# Patient Record
Sex: MALE | Race: OTHER
[De-identification: names, ages, dates, MRNs, and addresses within clinical notes are randomized per-mention and may not be internally consistent; named-entity substitution may affect disease eponyms.]

---

## 2020-02-05 ENCOUNTER — HOSPITAL ENCOUNTER (EMERGENCY)
Dept: HOSPITAL 74 - JER | Age: 50
LOS: 1 days | Discharge: HOME | End: 2020-02-06
Payer: COMMERCIAL

## 2020-02-05 VITALS — TEMPERATURE: 98.1 F

## 2020-02-05 VITALS — BODY MASS INDEX: 31.1 KG/M2
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
## 2020-02-05 DIAGNOSIS — S91.332A: Primary | ICD-10-CM

## 2020-02-05 DIAGNOSIS — E11.9: ICD-10-CM
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
## 2020-02-05 DIAGNOSIS — I10: ICD-10-CM

## 2020-02-05 NOTE — PDOC
History of Present Illness





- General


Chief Complaint: Injury


Stated Complaint: PUNCTURE NAILS


Time Seen by Provider: 02/05/20 21:22


History Source: Patient, Old Records


Exam Limitations: No Limitations





- History of Present Illness


Initial Comments: 





02/05/20 22:33





HISTORY OF PRESENT ILLNESS: 49-year-old male with past medical history of 

hypertension, appendectomy, DM 2 who presents emergency department for 

evaluation of puncture wound to his left foot.  Patient works as a day  

and was doing gildardo today when he got home he noticed bleeding from the sole 

of his left foot.  Upon inspection there was a nail in his shoe which is 

through the sole and it pierced the plantar surface of the foot.  Patient 

immediately remove the nail from his shoe and applied a dressing to his foot.  

Patient is unsure of his last tetanus shot per chart review reveals it was in 

2014.  He denies any pain in his foot but does endorse neuropathy.  Patient is 

noncompliant with his blood pressure or diabetes medications as he does not 

have insurance so he does not go to the doctor to get his medications refilled.

  Upon arrival noted to have a blood pressure of 201/117.  He denies any 

headaches, chest pain, shortness of breath, dizziness, flank pain, hematuria.





No recent travel or sick contacts. 


PAST MEDICAL HISTORY: See HPI


SURGICAL HISTORY: Denies


ALLERGIES: No known drug allergies





REVIEW OF SYSTEMS


General/Constitutional: Denies fever or chills. Denies weakness, weight change.


HEENT: Denies change in vision. Denies ear pain or discharge. Denies sore 

throat.


Cardiovascular: Denies chest pain or shortness of breath.


Respiratory: Denies cough, wheezing, or hemoptysis.


Gastrointestinal: Denies nausea, vomiting, diarrhea or constipation. Denies 

rectal bleeding.


Genitourinary: Denies dysuria, frequency, or change in urination.


Musculoskeletal: See HPI


Skin and breasts: Denies rash or easy bruising.


Neurologic: Denies headache, vertigo, loss of consciousness, or loss of 

sensation.


Psychiatric: Denies depression or anxiety.


Endocrine: Denies increased thirst. Denies abnormal weight change.


Hematologic/Lymphatic: Denies anemia, easy bleeding, or history of blood clots.


Allergic/Immunologic: Denies hives or skin allergy. Denies latex allergy.











PHYSICAL EXAM


General Appearance: Well-appearing, appropriately dressed.  No apparent distress

, no intoxication.


Respiratory/Chest: Lungs CTAB.  No shortness of breath, chest tenderness, 

respiratory distress, accessory muscle use. No crackles, rales, rhonchi, stridor

, wheezing, dullness


Cardiovascular: RRR. S1, S2.  No JVD, murmur, bradycardia, tachycardia.


Vascular Pulses: Dorsalis-Pedis (R): 2+, Dorsalis-Pedis (L): 2+


Musculoskeletal/Extremities:  Normal inspection. FROM of all extremities, 

normal capillary refill.  Pelvis Stable.  No CVA tenderness. No tenderness to 

extremities, pedal edema, swelling, erythema or deformity.  Subcentimeter 

puncture wound present to the lateral plantar surface of the left foot.  No 

bony tenderness, deformity, crepitus or step-off is present.  Neurovascularly 

intact.


Integumentary: Appropriate color, dry, warm.  No cyanosis, erythema, jaundice 

or rash


Neurologic: CNs II-XII intact. Fully oriented, alert.  Appropriate mood/affect. 

Motor strength 5/5.  No appreciable EOM palsy, facial droop or sensory deficit.

  Gait is steady.





Past History





- Past Medical History


Allergies/Adverse Reactions: 


 Allergies











Allergy/AdvReac Type Severity Reaction Status Date / Time


 


No Known Allergies Allergy   Verified 02/05/20 21:51











Home Medications: 


Ambulatory Orders





Lisinopril [Prinivil] 10 mg PO DAILY #30 tablet 09/27/14 


metFORMIN HCL [Glucophage -] 1,000 mg PO BIDI #60 tablet 09/27/14 


Silver Sulfadiazine 1% Top Cr [Silvadene -] 1 applic TP BID #60 gr 08/22/18 








COPD: No


DVT: No


Diabetes: Yes (non complient with meds)


HTN: Yes (no meds)





- Surgical History


Appendectomy: Yes





- Immunization History


Immunization Up to Date: Yes





- Psycho Social/Smoking Cessation Hx


Smoking History: Never smoked


Have you smoked in the past 12 months: No


Information on smoking cessation initiated: No


Hx Alcohol Use: No


Drug/Substance Use Hx: No


Substance Use Type: Alcohol





*Physical Exam





- Vital Signs


 Last Vital Signs











Temp Pulse Resp BP Pulse Ox


 


 98.1 F   96 H  18   201/117 H  100 


 


 02/05/20 21:35  02/05/20 21:35  02/05/20 21:35  02/05/20 21:35  02/05/20 21:35














ED Treatment Course





- LABORATORY


CBC & Chemistry Diagram: 


 02/05/20 23:45





 02/05/20 23:45





Medical Decision Making





- Medical Decision Making





02/05/20 22:37


A/P:


49-year-old male with puncture wound to the plantar surface of his left foot





No erythema, discharge or drainage is present surrounding the wound.


Unable to express blood or fluid from wound.





As patient is a diabetic who is noncompliant with his medications I will get 

basic labs and urinalysis.


Lisinopril 10 mg orally now


EKG


Reassess





02/06/20 01:17


X-ray of the left foot as read by Dr. Juarez: No fracture or acute pathology.





EKG sinus rhythm normal intervals.  QTc 442 ms.  Normal axis.  No ischemic 

changes present.





Laboratory testing notable for random glucose 176, BUN 24.3, creatinine 1.2.  

Normal GFR this likely prerenal.


CBC is unremarkable.





Wound copiously irrigated.  As are no signs of infection now dry sterile 

dressing will be placed patient will be discharged home.  All patient has had a 

tetanus shot within the past 10 years his track record of poor follow-up 

therefore I will update his tetanus at this time.





I will discharge patient home to follow-up with [facility] 

[facility] for continued evaluation and treatment of his hypertension and diabetes.





I discussed the physical exam findings, ancillary test results and final 

diagnoses with the patient. I answered all of the patient's questions. The 

patient was satisfied with the care received and felt comfortable with the 

discharge plan and treatment plan. The patient will call their primary care 

physician within 24 hours to arrange follow-up and will return to the Emergency 

Department with any new, persistent or worsening symptoms.


02/06/20 01:52








Discharge





- Discharge Information


Problems reviewed: Yes


Clinical Impression/Diagnosis: 


Puncture wound of plantar aspect of foot


Qualifiers:


 Encounter type: initial encounter Laterality: left Qualified Code(s): S91.332A 

- Puncture wound without foreign body, left foot, initial encounter





Hypertension


Qualifiers:


 Hypertension type: essential hypertension Qualified Code(s): I10 - Essential (

primary) hypertension





Condition: Stable


Disposition: HOME





- Admission


No





- Follow up/Referral


Referrals: 


[facility] [Provider Group]





- Patient Discharge Instructions


Additional Instructions: 


It is very important that you follow-up with a regular doctor for continued 

management of your blood pressure and diabetes.


A phone number has been provided for you for a primary doctor.


Keep wound clean and dry until it heals.


Return to the emergency department for redness of the foot, discharge or 

drainage from the foot, severe pain or for any other concerns.


Thank you very much for choosing us to provide your emergent healthcare needs.





- Post Discharge Activity

## 2020-02-06 VITALS — DIASTOLIC BLOOD PRESSURE: 103 MMHG | SYSTOLIC BLOOD PRESSURE: 171 MMHG | HEART RATE: 82 BPM

## 2020-02-06 LAB
ALBUMIN SERPL-MCNC: 3.1 G/DL (ref 3.4–5)
ALP SERPL-CCNC: 105 U/L (ref 45–117)
ALT SERPL-CCNC: 21 U/L (ref 13–61)
ANION GAP SERPL CALC-SCNC: 7 MMOL/L (ref 8–16)
AST SERPL-CCNC: 22 U/L (ref 15–37)
BASOPHILS # BLD: 1.1 % (ref 0–2)
BILIRUB SERPL-MCNC: 0.3 MG/DL (ref 0.2–1)
BUN SERPL-MCNC: 24.3 MG/DL (ref 7–18)
CALCIUM SERPL-MCNC: 8 MG/DL (ref 8.5–10.1)
CHLORIDE SERPL-SCNC: 102 MMOL/L (ref 98–107)
CO2 SERPL-SCNC: 27 MMOL/L (ref 21–32)
CREAT SERPL-MCNC: 1.2 MG/DL (ref 0.55–1.3)
DEPRECATED RDW RBC AUTO: 12.5 % (ref 11.9–15.9)
EOSINOPHIL # BLD: 2 % (ref 0–4.5)
GLUCOSE SERPL-MCNC: 176 MG/DL (ref 74–106)
HCT VFR BLD CALC: 41.2 % (ref 35.4–49)
HGB BLD-MCNC: 14.7 GM/DL (ref 11.7–16.9)
LYMPHOCYTES # BLD: 27.2 % (ref 8–40)
MCH RBC QN AUTO: 32.6 PG (ref 25.7–33.7)
MCHC RBC AUTO-ENTMCNC: 35.6 G/DL (ref 32–35.9)
MCV RBC: 91.5 FL (ref 80–96)
MONOCYTES # BLD AUTO: 6.7 % (ref 3.8–10.2)
NEUTROPHILS # BLD: 63 % (ref 42.8–82.8)
PLATELET # BLD AUTO: 253 K/MM3 (ref 134–434)
PMV BLD: 8.1 FL (ref 7.5–11.1)
POTASSIUM SERPLBLD-SCNC: 4 MMOL/L (ref 3.5–5.1)
PROT SERPL-MCNC: 6.8 G/DL (ref 6.4–8.2)
RBC # BLD AUTO: 4.5 M/MM3 (ref 4–5.6)
SODIUM SERPL-SCNC: 136 MMOL/L (ref 136–145)
WBC # BLD AUTO: 6.6 K/MM3 (ref 4–10)

## 2020-02-06 NOTE — EKG
Test Reason : 

Blood Pressure : ***/*** mmHG

Vent. Rate : 085 BPM     Atrial Rate : 085 BPM

   P-R Int : 134 ms          QRS Dur : 098 ms

    QT Int : 372 ms       P-R-T Axes : 037 031 013 degrees

   QTc Int : 442 ms

 

NORMAL SINUS RHYTHM

POSSIBLE LEFT ATRIAL ENLARGEMENT

CANNOT RULE OUT ANTERIOR INFARCT (CITED ON OR BEFORE 05-FEB-2020)

ABNORMAL ECG

WHEN COMPARED WITH ECG OF 15-SEP-2014 09:57,

NO SIGNIFICANT CHANGE WAS FOUND

Confirmed by DALLIN MAE MD ([id number]) on 2/6/2020 2:26:14 PM

 

Referred By:             Confirmed By:DALLIN MAE MD

## 2021-02-19 ENCOUNTER — HOSPITAL ENCOUNTER (INPATIENT)
Dept: HOSPITAL 74 - JER | Age: 51
LOS: 6 days | Discharge: HOME HEALTH | DRG: 45 | End: 2021-02-25
Attending: GENERAL ACUTE CARE HOSPITAL | Admitting: INTERNAL MEDICINE
Payer: COMMERCIAL

## 2021-02-19 VITALS — BODY MASS INDEX: 29.6 KG/M2

## 2021-02-19 DIAGNOSIS — I69.351: ICD-10-CM

## 2021-02-19 DIAGNOSIS — N17.9: ICD-10-CM

## 2021-02-19 DIAGNOSIS — N18.9: ICD-10-CM

## 2021-02-19 DIAGNOSIS — E11.22: ICD-10-CM

## 2021-02-19 DIAGNOSIS — I12.9: ICD-10-CM

## 2021-02-19 DIAGNOSIS — E11.65: ICD-10-CM

## 2021-02-19 DIAGNOSIS — E11.21: ICD-10-CM

## 2021-02-19 DIAGNOSIS — I63.9: Primary | ICD-10-CM

## 2021-02-19 DIAGNOSIS — E11.9: ICD-10-CM

## 2021-02-19 DIAGNOSIS — R47.81: ICD-10-CM

## 2021-02-19 DIAGNOSIS — E66.3: ICD-10-CM

## 2021-02-19 DIAGNOSIS — I10: ICD-10-CM

## 2021-02-19 LAB
ALBUMIN SERPL-MCNC: 2.9 G/DL (ref 3.4–5)
ALP SERPL-CCNC: 109 U/L (ref 45–117)
ALT SERPL-CCNC: 26 U/L (ref 13–61)
ANION GAP SERPL CALC-SCNC: 7 MMOL/L (ref 8–16)
AST SERPL-CCNC: 13 U/L (ref 15–37)
BASOPHILS # BLD: 0.8 % (ref 0–2)
BILIRUB SERPL-MCNC: 0.3 MG/DL (ref 0.2–1)
BUN SERPL-MCNC: 32 MG/DL (ref 7–18)
CALCIUM SERPL-MCNC: 8.8 MG/DL (ref 8.5–10.1)
CHLORIDE SERPL-SCNC: 104 MMOL/L (ref 98–107)
CO2 SERPL-SCNC: 30 MMOL/L (ref 21–32)
CREAT SERPL-MCNC: 1.5 MG/DL (ref 0.55–1.3)
DEPRECATED RDW RBC AUTO: 13.2 % (ref 11.9–15.9)
EOSINOPHIL # BLD: 1.8 % (ref 0–4.5)
GLUCOSE SERPL-MCNC: 243 MG/DL (ref 74–106)
HCT VFR BLD CALC: 42.2 % (ref 35.4–49)
HGB BLD-MCNC: 14.7 GM/DL (ref 11.7–16.9)
LYMPHOCYTES # BLD: 26.9 % (ref 8–40)
MAGNESIUM SERPL-MCNC: 2.1 MG/DL (ref 1.8–2.4)
MCH RBC QN AUTO: 32 PG (ref 25.7–33.7)
MCHC RBC AUTO-ENTMCNC: 34.8 G/DL (ref 32–35.9)
MCV RBC: 91.9 FL (ref 80–96)
MONOCYTES # BLD AUTO: 7.3 % (ref 3.8–10.2)
NEUTROPHILS # BLD: 63.2 % (ref 42.8–82.8)
PLATELET # BLD AUTO: 230 K/MM3 (ref 134–434)
PMV BLD: 8 FL (ref 7.5–11.1)
POTASSIUM SERPLBLD-SCNC: 4.1 MMOL/L (ref 3.5–5.1)
PROT SERPL-MCNC: 6.7 G/DL (ref 6.4–8.2)
RBC # BLD AUTO: 4.6 M/MM3 (ref 4–5.6)
SODIUM SERPL-SCNC: 141 MMOL/L (ref 136–145)
WBC # BLD AUTO: 5.2 K/MM3 (ref 4–10)

## 2021-02-19 PROCEDURE — U0003 INFECTIOUS AGENT DETECTION BY NUCLEIC ACID (DNA OR RNA); SEVERE ACUTE RESPIRATORY SYNDROME CORONAVIRUS 2 (SARS-COV-2) (CORONAVIRUS DISEASE [COVID-19]), AMPLIFIED PROBE TECHNIQUE, MAKING USE OF HIGH THROUGHPUT TECHNOLOGIES AS DESCRIBED BY CMS-2020-01-R: HCPCS

## 2021-02-19 PROCEDURE — C9803 HOPD COVID-19 SPEC COLLECT: HCPCS

## 2021-02-20 LAB
ALBUMIN SERPL-MCNC: 2.8 G/DL (ref 3.4–5)
ALP SERPL-CCNC: 94 U/L (ref 45–117)
ALT SERPL-CCNC: 22 U/L (ref 13–61)
ANION GAP SERPL CALC-SCNC: 7 MMOL/L (ref 8–16)
APPEARANCE UR: CLEAR
AST SERPL-CCNC: 11 U/L (ref 15–37)
BACTERIA # UR AUTO: 28 /UL (ref 0–1359)
BASOPHILS # BLD: 0.4 % (ref 0–2)
BILIRUB SERPL-MCNC: 0.3 MG/DL (ref 0.2–1)
BILIRUB UR STRIP.AUTO-MCNC: NEGATIVE MG/DL
BUN SERPL-MCNC: 27.3 MG/DL (ref 7–18)
CALCIUM SERPL-MCNC: 9 MG/DL (ref 8.5–10.1)
CASTS URNS QL MICRO: 1 /UL (ref 0–3.1)
CHLORIDE SERPL-SCNC: 105 MMOL/L (ref 98–107)
CHOLEST SERPL-MCNC: 288 MG/DL (ref 50–200)
CO2 SERPL-SCNC: 28 MMOL/L (ref 21–32)
COLOR UR: YELLOW
CREAT SERPL-MCNC: 1.5 MG/DL (ref 0.55–1.3)
DEPRECATED RDW RBC AUTO: 13.1 % (ref 11.9–15.9)
EOSINOPHIL # BLD: 0.2 % (ref 0–4.5)
EPITH CASTS URNS QL MICRO: 9 /UL (ref 0–25.1)
GLUCOSE SERPL-MCNC: 259 MG/DL (ref 74–106)
HCT VFR BLD CALC: 42.5 % (ref 35.4–49)
HDLC SERPL-MCNC: 70 MG/DL (ref 40–60)
HGB BLD-MCNC: 14.5 GM/DL (ref 11.7–16.9)
KETONES UR QL STRIP: NEGATIVE
LDLC SERPL CALC-MCNC: 181 MG/DL (ref 5–100)
LEUKOCYTE ESTERASE UR QL STRIP.AUTO: NEGATIVE
LYMPHOCYTES # BLD: 7.6 % (ref 8–40)
MAGNESIUM SERPL-MCNC: 2.2 MG/DL (ref 1.8–2.4)
MCH RBC QN AUTO: 31.5 PG (ref 25.7–33.7)
MCHC RBC AUTO-ENTMCNC: 34.2 G/DL (ref 32–35.9)
MCV RBC: 92.2 FL (ref 80–96)
MONOCYTES # BLD AUTO: 4.1 % (ref 3.8–10.2)
NEUTROPHILS # BLD: 87.7 % (ref 42.8–82.8)
NITRITE UR QL STRIP: NEGATIVE
PH UR: 6 [PH] (ref 5–8)
PHOSPHATE SERPL-MCNC: 4.2 MG/DL (ref 2.5–4.9)
PLATELET # BLD AUTO: 250 K/MM3 (ref 134–434)
PMV BLD: 8.6 FL (ref 7.5–11.1)
POTASSIUM SERPLBLD-SCNC: 4 MMOL/L (ref 3.5–5.1)
PROT SERPL-MCNC: 6.6 G/DL (ref 6.4–8.2)
PROT UR QL STRIP: (no result)
PROT UR QL STRIP: (no result)
RBC # BLD AUTO: 10 /UL (ref 0–23.9)
RBC # BLD AUTO: 4.61 M/MM3 (ref 4–5.6)
SODIUM SERPL-SCNC: 139 MMOL/L (ref 136–145)
SP GR UR: 1.02 (ref 1.01–1.03)
TRIGL SERPL-MCNC: 165 MG/DL (ref 0–150)
UROBILINOGEN UR STRIP-MCNC: 0.2 MG/DL (ref 0.2–1)
WBC # BLD AUTO: 8 K/MM3 (ref 4–10)
WBC # UR AUTO: 4 /UL (ref 0–25.8)

## 2021-02-20 RX ADMIN — HYDRALAZINE HYDROCHLORIDE SCH MG: 25 TABLET, FILM COATED ORAL at 22:10

## 2021-02-20 RX ADMIN — HYDRALAZINE HYDROCHLORIDE SCH: 25 TABLET, FILM COATED ORAL at 13:43

## 2021-02-20 RX ADMIN — INSULIN DETEMIR SCH UNITS: 100 INJECTION, SOLUTION SUBCUTANEOUS at 22:15

## 2021-02-20 RX ADMIN — HEPARIN SODIUM SCH: 5000 INJECTION, SOLUTION INTRAVENOUS; SUBCUTANEOUS at 06:36

## 2021-02-20 RX ADMIN — HEPARIN SODIUM SCH UNIT: 5000 INJECTION, SOLUTION INTRAVENOUS; SUBCUTANEOUS at 22:24

## 2021-02-20 RX ADMIN — Medication SCH EACH: at 22:15

## 2021-02-20 RX ADMIN — INSULIN ASPART SCH UNITS: 100 INJECTION, SOLUTION INTRAVENOUS; SUBCUTANEOUS at 12:02

## 2021-02-20 RX ADMIN — INSULIN ASPART SCH UNITS: 100 INJECTION, SOLUTION INTRAVENOUS; SUBCUTANEOUS at 06:53

## 2021-02-20 RX ADMIN — INSULIN ASPART SCH UNITS: 100 INJECTION, SOLUTION INTRAVENOUS; SUBCUTANEOUS at 22:15

## 2021-02-20 RX ADMIN — INSULIN ASPART SCH UNITS: 100 INJECTION, SOLUTION INTRAVENOUS; SUBCUTANEOUS at 16:34

## 2021-02-20 RX ADMIN — HYDRALAZINE HYDROCHLORIDE SCH MG: 25 TABLET, FILM COATED ORAL at 13:50

## 2021-02-20 RX ADMIN — ACETAMINOPHEN PRN MG: 325 TABLET ORAL at 14:29

## 2021-02-20 RX ADMIN — HYDRALAZINE HYDROCHLORIDE SCH MG: 25 TABLET, FILM COATED ORAL at 13:39

## 2021-02-20 RX ADMIN — HYDRALAZINE HYDROCHLORIDE SCH MG: 25 TABLET, FILM COATED ORAL at 05:55

## 2021-02-20 RX ADMIN — HEPARIN SODIUM SCH UNIT: 5000 INJECTION, SOLUTION INTRAVENOUS; SUBCUTANEOUS at 13:39

## 2021-02-20 RX ADMIN — HYDRALAZINE HYDROCHLORIDE SCH: 25 TABLET, FILM COATED ORAL at 06:18

## 2021-02-21 LAB
ALBUMIN SERPL-MCNC: 2.6 G/DL (ref 3.4–5)
ALP SERPL-CCNC: 79 U/L (ref 45–117)
ALT SERPL-CCNC: 24 U/L (ref 13–61)
ANION GAP SERPL CALC-SCNC: 6 MMOL/L (ref 8–16)
AST SERPL-CCNC: 18 U/L (ref 15–37)
BILIRUB SERPL-MCNC: 0.5 MG/DL (ref 0.2–1)
BUN SERPL-MCNC: 25.1 MG/DL (ref 7–18)
CALCIUM SERPL-MCNC: 8.5 MG/DL (ref 8.5–10.1)
CHLORIDE SERPL-SCNC: 105 MMOL/L (ref 98–107)
CO2 SERPL-SCNC: 29 MMOL/L (ref 21–32)
CREAT SERPL-MCNC: 1.5 MG/DL (ref 0.55–1.3)
DEPRECATED RDW RBC AUTO: 13.1 % (ref 11.9–15.9)
GLUCOSE SERPL-MCNC: 176 MG/DL (ref 74–106)
HCT VFR BLD CALC: 40.9 % (ref 35.4–49)
HGB BLD-MCNC: 14.1 GM/DL (ref 11.7–16.9)
MCH RBC QN AUTO: 32.2 PG (ref 25.7–33.7)
MCHC RBC AUTO-ENTMCNC: 34.5 G/DL (ref 32–35.9)
MCV RBC: 93.4 FL (ref 80–96)
PLATELET # BLD AUTO: 223 K/MM3 (ref 134–434)
PMV BLD: 8.6 FL (ref 7.5–11.1)
POTASSIUM SERPLBLD-SCNC: 3.8 MMOL/L (ref 3.5–5.1)
PROT SERPL-MCNC: 6.1 G/DL (ref 6.4–8.2)
RBC # BLD AUTO: 4.38 M/MM3 (ref 4–5.6)
SODIUM SERPL-SCNC: 140 MMOL/L (ref 136–145)
WBC # BLD AUTO: 5.4 K/MM3 (ref 4–10)

## 2021-02-21 RX ADMIN — HEPARIN SODIUM SCH UNIT: 5000 INJECTION, SOLUTION INTRAVENOUS; SUBCUTANEOUS at 13:52

## 2021-02-21 RX ADMIN — ATORVASTATIN CALCIUM SCH MG: 40 TABLET, FILM COATED ORAL at 21:37

## 2021-02-21 RX ADMIN — INSULIN DETEMIR SCH UNITS: 100 INJECTION, SOLUTION SUBCUTANEOUS at 21:38

## 2021-02-21 RX ADMIN — INSULIN DETEMIR SCH UNITS: 100 INJECTION, SOLUTION SUBCUTANEOUS at 09:32

## 2021-02-21 RX ADMIN — ASPIRIN 81 MG SCH MG: 81 TABLET ORAL at 09:32

## 2021-02-21 RX ADMIN — HEPARIN SODIUM SCH UNIT: 5000 INJECTION, SOLUTION INTRAVENOUS; SUBCUTANEOUS at 06:56

## 2021-02-21 RX ADMIN — INSULIN ASPART SCH UNITS: 100 INJECTION, SOLUTION INTRAVENOUS; SUBCUTANEOUS at 11:21

## 2021-02-21 RX ADMIN — ACETAMINOPHEN PRN MG: 325 TABLET ORAL at 13:52

## 2021-02-21 RX ADMIN — ACETAMINOPHEN PRN MG: 325 TABLET ORAL at 21:37

## 2021-02-21 RX ADMIN — INSULIN ASPART SCH UNITS: 100 INJECTION, SOLUTION INTRAVENOUS; SUBCUTANEOUS at 06:56

## 2021-02-21 RX ADMIN — AMLODIPINE BESYLATE SCH MG: 10 TABLET ORAL at 09:32

## 2021-02-21 RX ADMIN — INSULIN ASPART SCH: 100 INJECTION, SOLUTION INTRAVENOUS; SUBCUTANEOUS at 17:17

## 2021-02-21 RX ADMIN — ACETAMINOPHEN PRN MG: 325 TABLET ORAL at 09:32

## 2021-02-21 RX ADMIN — INSULIN ASPART SCH UNITS: 100 INJECTION, SOLUTION INTRAVENOUS; SUBCUTANEOUS at 21:38

## 2021-02-21 RX ADMIN — HEPARIN SODIUM SCH UNIT: 5000 INJECTION, SOLUTION INTRAVENOUS; SUBCUTANEOUS at 21:37

## 2021-02-21 RX ADMIN — Medication SCH EACH: at 21:37

## 2021-02-21 RX ADMIN — HYDRALAZINE HYDROCHLORIDE SCH MG: 25 TABLET, FILM COATED ORAL at 06:56

## 2021-02-22 LAB
ALBUMIN SERPL-MCNC: 3 G/DL (ref 3.4–5)
ALP SERPL-CCNC: 90 U/L (ref 45–117)
ALT SERPL-CCNC: 29 U/L (ref 13–61)
AMPHET UR-MCNC: NEGATIVE NG/ML
ANION GAP SERPL CALC-SCNC: 8 MMOL/L (ref 8–16)
AST SERPL-CCNC: 20 U/L (ref 15–37)
BARBITURATES UR-MCNC: NEGATIVE NG/ML
BENZODIAZ UR SCN-MCNC: NEGATIVE NG/ML
BILIRUB SERPL-MCNC: 0.9 MG/DL (ref 0.2–1)
BUN SERPL-MCNC: 25.3 MG/DL (ref 7–18)
CALCIUM SERPL-MCNC: 9 MG/DL (ref 8.5–10.1)
CHLORIDE SERPL-SCNC: 107 MMOL/L (ref 98–107)
CO2 SERPL-SCNC: 25 MMOL/L (ref 21–32)
COCAINE UR-MCNC: NEGATIVE NG/ML
CREAT SERPL-MCNC: 1.5 MG/DL (ref 0.55–1.3)
DEPRECATED RDW RBC AUTO: 13 % (ref 11.9–15.9)
GLUCOSE SERPL-MCNC: 175 MG/DL (ref 74–106)
HCT VFR BLD CALC: 44.2 % (ref 35.4–49)
HGB BLD-MCNC: 15.4 GM/DL (ref 11.7–16.9)
MAGNESIUM SERPL-MCNC: 2.3 MG/DL (ref 1.8–2.4)
MCH RBC QN AUTO: 32.1 PG (ref 25.7–33.7)
MCHC RBC AUTO-ENTMCNC: 35 G/DL (ref 32–35.9)
MCV RBC: 91.9 FL (ref 80–96)
METHADONE UR-MCNC: NEGATIVE NG/ML
OPIATES UR QL SCN: NEGATIVE NG/ML
PCP UR QL SCN: NEGATIVE NG/ML
PHOSPHATE SERPL-MCNC: 4.1 MG/DL (ref 2.5–4.9)
PLATELET # BLD AUTO: 254 K/MM3 (ref 134–434)
PMV BLD: 8.4 FL (ref 7.5–11.1)
POTASSIUM SERPLBLD-SCNC: 4.3 MMOL/L (ref 3.5–5.1)
PROT SERPL-MCNC: 6.7 G/DL (ref 6.4–8.2)
RBC # BLD AUTO: 4.81 M/MM3 (ref 4–5.6)
SODIUM SERPL-SCNC: 140 MMOL/L (ref 136–145)
WBC # BLD AUTO: 5.8 K/MM3 (ref 4–10)

## 2021-02-22 RX ADMIN — INSULIN ASPART SCH: 100 INJECTION, SOLUTION INTRAVENOUS; SUBCUTANEOUS at 06:34

## 2021-02-22 RX ADMIN — AMLODIPINE BESYLATE SCH MG: 10 TABLET ORAL at 12:06

## 2021-02-22 RX ADMIN — INSULIN ASPART SCH UNITS: 100 INJECTION, SOLUTION INTRAVENOUS; SUBCUTANEOUS at 17:35

## 2021-02-22 RX ADMIN — HEPARIN SODIUM SCH UNIT: 5000 INJECTION, SOLUTION INTRAVENOUS; SUBCUTANEOUS at 14:54

## 2021-02-22 RX ADMIN — HEPARIN SODIUM SCH UNIT: 5000 INJECTION, SOLUTION INTRAVENOUS; SUBCUTANEOUS at 21:13

## 2021-02-22 RX ADMIN — INSULIN DETEMIR SCH UNITS: 100 INJECTION, SOLUTION SUBCUTANEOUS at 12:08

## 2021-02-22 RX ADMIN — INSULIN ASPART SCH UNITS: 100 INJECTION, SOLUTION INTRAVENOUS; SUBCUTANEOUS at 21:21

## 2021-02-22 RX ADMIN — HEPARIN SODIUM SCH UNIT: 5000 INJECTION, SOLUTION INTRAVENOUS; SUBCUTANEOUS at 06:33

## 2021-02-22 RX ADMIN — ASPIRIN 81 MG SCH MG: 81 TABLET ORAL at 12:06

## 2021-02-22 RX ADMIN — ATORVASTATIN CALCIUM SCH MG: 40 TABLET, FILM COATED ORAL at 21:13

## 2021-02-22 RX ADMIN — INSULIN ASPART SCH UNITS: 100 INJECTION, SOLUTION INTRAVENOUS; SUBCUTANEOUS at 12:07

## 2021-02-22 RX ADMIN — ACETAMINOPHEN PRN MG: 325 TABLET ORAL at 14:55

## 2021-02-22 RX ADMIN — INSULIN DETEMIR SCH UNITS: 100 INJECTION, SOLUTION SUBCUTANEOUS at 21:22

## 2021-02-22 RX ADMIN — LISINOPRIL SCH MG: 10 TABLET ORAL at 12:06

## 2021-02-23 LAB
ALBUMIN SERPL-MCNC: 2.9 G/DL (ref 3.4–5)
ALP SERPL-CCNC: 86 U/L (ref 45–117)
ALT SERPL-CCNC: 27 U/L (ref 13–61)
ANION GAP SERPL CALC-SCNC: 7 MMOL/L (ref 8–16)
AST SERPL-CCNC: 17 U/L (ref 15–37)
BASOPHILS # BLD: 0.9 % (ref 0–2)
BILIRUB SERPL-MCNC: 0.6 MG/DL (ref 0.2–1)
BUN SERPL-MCNC: 24.2 MG/DL (ref 7–18)
CALCIUM SERPL-MCNC: 8.9 MG/DL (ref 8.5–10.1)
CHLORIDE SERPL-SCNC: 106 MMOL/L (ref 98–107)
CO2 SERPL-SCNC: 26 MMOL/L (ref 21–32)
CREAT SERPL-MCNC: 1.4 MG/DL (ref 0.55–1.3)
DEPRECATED RDW RBC AUTO: 12.9 % (ref 11.9–15.9)
EOSINOPHIL # BLD: 2.1 % (ref 0–4.5)
GLUCOSE SERPL-MCNC: 148 MG/DL (ref 74–106)
HCT VFR BLD CALC: 41.7 % (ref 35.4–49)
HGB BLD-MCNC: 14.6 GM/DL (ref 11.7–16.9)
LYMPHOCYTES # BLD: 27.6 % (ref 8–40)
MAGNESIUM SERPL-MCNC: 2.2 MG/DL (ref 1.8–2.4)
MCH RBC QN AUTO: 32.1 PG (ref 25.7–33.7)
MCHC RBC AUTO-ENTMCNC: 35 G/DL (ref 32–35.9)
MCV RBC: 91.5 FL (ref 80–96)
MONOCYTES # BLD AUTO: 7.3 % (ref 3.8–10.2)
NEUTROPHILS # BLD: 62.1 % (ref 42.8–82.8)
PHOSPHATE SERPL-MCNC: 4.3 MG/DL (ref 2.5–4.9)
PLATELET # BLD AUTO: 249 K/MM3 (ref 134–434)
PMV BLD: 8.1 FL (ref 7.5–11.1)
POTASSIUM SERPLBLD-SCNC: 4.4 MMOL/L (ref 3.5–5.1)
PROT SERPL-MCNC: 6.5 G/DL (ref 6.4–8.2)
RBC # BLD AUTO: 4.56 M/MM3 (ref 4–5.6)
SODIUM SERPL-SCNC: 139 MMOL/L (ref 136–145)
WBC # BLD AUTO: 5.1 K/MM3 (ref 4–10)

## 2021-02-23 RX ADMIN — ACETAMINOPHEN PRN MG: 325 TABLET ORAL at 06:38

## 2021-02-23 RX ADMIN — INSULIN DETEMIR SCH UNITS: 100 INJECTION, SOLUTION SUBCUTANEOUS at 11:21

## 2021-02-23 RX ADMIN — INSULIN DETEMIR SCH UNITS: 100 INJECTION, SOLUTION SUBCUTANEOUS at 21:29

## 2021-02-23 RX ADMIN — LISINOPRIL SCH MG: 10 TABLET ORAL at 09:53

## 2021-02-23 RX ADMIN — INSULIN ASPART SCH: 100 INJECTION, SOLUTION INTRAVENOUS; SUBCUTANEOUS at 06:36

## 2021-02-23 RX ADMIN — HEPARIN SODIUM SCH UNIT: 5000 INJECTION, SOLUTION INTRAVENOUS; SUBCUTANEOUS at 13:51

## 2021-02-23 RX ADMIN — ATORVASTATIN CALCIUM SCH MG: 40 TABLET, FILM COATED ORAL at 21:27

## 2021-02-23 RX ADMIN — INSULIN ASPART SCH UNITS: 100 INJECTION, SOLUTION INTRAVENOUS; SUBCUTANEOUS at 16:44

## 2021-02-23 RX ADMIN — INSULIN ASPART SCH: 100 INJECTION, SOLUTION INTRAVENOUS; SUBCUTANEOUS at 21:28

## 2021-02-23 RX ADMIN — ACETAMINOPHEN PRN MG: 325 TABLET ORAL at 21:26

## 2021-02-23 RX ADMIN — Medication SCH: at 18:00

## 2021-02-23 RX ADMIN — ASPIRIN 81 MG SCH MG: 81 TABLET ORAL at 09:53

## 2021-02-23 RX ADMIN — INSULIN ASPART SCH UNITS: 100 INJECTION, SOLUTION INTRAVENOUS; SUBCUTANEOUS at 11:21

## 2021-02-23 RX ADMIN — HEPARIN SODIUM SCH UNIT: 5000 INJECTION, SOLUTION INTRAVENOUS; SUBCUTANEOUS at 21:27

## 2021-02-23 RX ADMIN — AMLODIPINE BESYLATE SCH MG: 10 TABLET ORAL at 09:53

## 2021-02-23 RX ADMIN — HEPARIN SODIUM SCH UNIT: 5000 INJECTION, SOLUTION INTRAVENOUS; SUBCUTANEOUS at 06:09

## 2021-02-24 RX ADMIN — INSULIN DETEMIR SCH UNITS: 100 INJECTION, SOLUTION SUBCUTANEOUS at 22:08

## 2021-02-24 RX ADMIN — HEPARIN SODIUM SCH UNIT: 5000 INJECTION, SOLUTION INTRAVENOUS; SUBCUTANEOUS at 22:08

## 2021-02-24 RX ADMIN — INSULIN ASPART SCH UNITS: 100 INJECTION, SOLUTION INTRAVENOUS; SUBCUTANEOUS at 22:09

## 2021-02-24 RX ADMIN — INSULIN ASPART SCH UNITS: 100 INJECTION, SOLUTION INTRAVENOUS; SUBCUTANEOUS at 11:49

## 2021-02-24 RX ADMIN — INSULIN ASPART SCH UNITS: 100 INJECTION, SOLUTION INTRAVENOUS; SUBCUTANEOUS at 18:41

## 2021-02-24 RX ADMIN — AMLODIPINE BESYLATE SCH MG: 10 TABLET ORAL at 11:38

## 2021-02-24 RX ADMIN — INSULIN ASPART SCH: 100 INJECTION, SOLUTION INTRAVENOUS; SUBCUTANEOUS at 06:21

## 2021-02-24 RX ADMIN — Medication SCH EACH: at 22:09

## 2021-02-24 RX ADMIN — INSULIN DETEMIR SCH UNITS: 100 INJECTION, SOLUTION SUBCUTANEOUS at 11:49

## 2021-02-24 RX ADMIN — HEPARIN SODIUM SCH UNIT: 5000 INJECTION, SOLUTION INTRAVENOUS; SUBCUTANEOUS at 17:29

## 2021-02-24 RX ADMIN — Medication SCH: at 19:05

## 2021-02-24 RX ADMIN — HEPARIN SODIUM SCH UNIT: 5000 INJECTION, SOLUTION INTRAVENOUS; SUBCUTANEOUS at 06:15

## 2021-02-24 RX ADMIN — LISINOPRIL SCH MG: 20 TABLET ORAL at 11:38

## 2021-02-24 RX ADMIN — ASPIRIN 81 MG SCH MG: 81 TABLET ORAL at 11:38

## 2021-02-24 RX ADMIN — ATORVASTATIN CALCIUM SCH MG: 40 TABLET, FILM COATED ORAL at 22:09

## 2021-02-25 VITALS — HEART RATE: 78 BPM | SYSTOLIC BLOOD PRESSURE: 119 MMHG | DIASTOLIC BLOOD PRESSURE: 74 MMHG

## 2021-02-25 VITALS — TEMPERATURE: 98.9 F

## 2021-02-25 LAB
ALBUMIN SERPL-MCNC: 2.7 G/DL (ref 3.4–5)
ALP SERPL-CCNC: 80 U/L (ref 45–117)
ALT SERPL-CCNC: 38 U/L (ref 13–61)
ANION GAP SERPL CALC-SCNC: 4 MMOL/L (ref 8–16)
AST SERPL-CCNC: 30 U/L (ref 15–37)
BASOPHILS # BLD: 1 % (ref 0–2)
BILIRUB SERPL-MCNC: 0.6 MG/DL (ref 0.2–1)
BUN SERPL-MCNC: 23.8 MG/DL (ref 7–18)
CALCIUM SERPL-MCNC: 8.5 MG/DL (ref 8.5–10.1)
CHLORIDE SERPL-SCNC: 108 MMOL/L (ref 98–107)
CO2 SERPL-SCNC: 26 MMOL/L (ref 21–32)
CREAT SERPL-MCNC: 1.3 MG/DL (ref 0.55–1.3)
DEPRECATED RDW RBC AUTO: 13 % (ref 11.9–15.9)
EOSINOPHIL # BLD: 1.6 % (ref 0–4.5)
GLUCOSE SERPL-MCNC: 152 MG/DL (ref 74–106)
HCT VFR BLD CALC: 40.8 % (ref 35.4–49)
HGB BLD-MCNC: 14.3 GM/DL (ref 11.7–16.9)
LYMPHOCYTES # BLD: 22.3 % (ref 8–40)
MAGNESIUM SERPL-MCNC: 2.1 MG/DL (ref 1.8–2.4)
MCH RBC QN AUTO: 31.9 PG (ref 25.7–33.7)
MCHC RBC AUTO-ENTMCNC: 35.1 G/DL (ref 32–35.9)
MCV RBC: 90.9 FL (ref 80–96)
MONOCYTES # BLD AUTO: 6.8 % (ref 3.8–10.2)
NEUTROPHILS # BLD: 68.3 % (ref 42.8–82.8)
PHOSPHATE SERPL-MCNC: 4 MG/DL (ref 2.5–4.9)
PLATELET # BLD AUTO: 227 K/MM3 (ref 134–434)
PMV BLD: 7.8 FL (ref 7.5–11.1)
POTASSIUM SERPLBLD-SCNC: 4.3 MMOL/L (ref 3.5–5.1)
PROT SERPL-MCNC: 6.1 G/DL (ref 6.4–8.2)
RBC # BLD AUTO: 4.48 M/MM3 (ref 4–5.6)
SODIUM SERPL-SCNC: 138 MMOL/L (ref 136–145)
WBC # BLD AUTO: 6 K/MM3 (ref 4–10)

## 2021-02-25 RX ADMIN — INSULIN ASPART SCH: 100 INJECTION, SOLUTION INTRAVENOUS; SUBCUTANEOUS at 06:50

## 2021-02-25 RX ADMIN — HEPARIN SODIUM SCH: 5000 INJECTION, SOLUTION INTRAVENOUS; SUBCUTANEOUS at 15:55

## 2021-02-25 RX ADMIN — AMLODIPINE BESYLATE SCH MG: 10 TABLET ORAL at 10:03

## 2021-02-25 RX ADMIN — ASPIRIN 81 MG SCH MG: 81 TABLET ORAL at 10:03

## 2021-02-25 RX ADMIN — LISINOPRIL SCH MG: 20 TABLET ORAL at 10:03

## 2021-02-25 RX ADMIN — INSULIN DETEMIR SCH UNITS: 100 INJECTION, SOLUTION SUBCUTANEOUS at 10:04

## 2021-02-25 RX ADMIN — INSULIN ASPART SCH UNITS: 100 INJECTION, SOLUTION INTRAVENOUS; SUBCUTANEOUS at 12:30

## 2021-02-25 RX ADMIN — HEPARIN SODIUM SCH UNIT: 5000 INJECTION, SOLUTION INTRAVENOUS; SUBCUTANEOUS at 06:36
